# Patient Record
(demographics unavailable — no encounter records)

---

## 2025-03-21 NOTE — HEALTH RISK ASSESSMENT
[No] : In the past 12 months have you used drugs other than those required for medical reasons? No [No falls in past year] : Patient reported no falls in the past year [Little interest or pleasure doing things] : 1) Little interest or pleasure doing things [Feeling down, depressed, or hopeless] : 2) Feeling down, depressed, or hopeless [0] : 2) Feeling down, depressed, or hopeless: Not at all (0) [PHQ-2 Negative - No further assessment needed] : PHQ-2 Negative - No further assessment needed [Never] : Never [HIV test declined] : HIV test declined [Hepatitis C test declined] : Hepatitis C test declined [With Family] : lives with family [] :  [# Of Children ___] : has [unfilled] children [Sexually Active] : sexually active [Feels Safe at Home] : Feels safe at home [Fully functional (bathing, dressing, toileting, transferring, walking, feeding)] : Fully functional (bathing, dressing, toileting, transferring, walking, feeding) [Fully functional (using the telephone, shopping, preparing meals, housekeeping, doing laundry, using] : Fully functional and needs no help or supervision to perform IADLs (using the telephone, shopping, preparing meals, housekeeping, doing laundry, using transportation, managing medications and managing finances) [Smoke Detector] : smoke detector [Carbon Monoxide Detector] : carbon monoxide detector [Seat Belt] :  uses seat belt [Sunscreen] : uses sunscreen [[id number]] : 0 [Change in mental status noted] : No change in mental status noted [Reports changes in hearing] : Reports no changes in hearing [Reports changes in vision] : Reports no changes in vision [Reports changes in dental health] : Reports no changes in dental health [PapSmearDate] : 08/24 [PapSmearComments] : Dr. Fidelia Gray [HIVComments] : Done with HIV [de-identified] : Has an ophthalmologist. [de-identified] : Going regularly. [AdvancecareDate] : 03/21/2025 [FreeTextEntry4] : Patient given Health Care Proxy information today.

## 2025-03-21 NOTE — HISTORY OF PRESENT ILLNESS
[de-identified] : Patient presents for a follow-up annual physical.  Patient is a 60-year-old female with a history of HTN, intracranial hemorrhage, endometriosis.  She was at work on 6/21/2023 and had L sided facial weakness and slurred speech.  She was seen at [facility].  She was found to have a large intracranial hemorrhage in the R basal ganglia.  She is s/p laparotomy for the endometriosis and ovarian cysts.  She has had multiple IVF attempts and had 2 daughters via surrogacy.  She has a thyroid nodule.  Patient declines the influenza vaccination and COVID-19 boosters.  She believes she had a Tdap about 2016.  She declines the Shingrix.  She may be interested in getting an MMR booster.

## 2025-03-21 NOTE — ASSESSMENT
[FreeTextEntry1] : (1) HCM - discussed diet, exercise, weight maintenance.  Labs drawn ahead of visit and reviewed with patient.  HIV testing offered and patient declined.  Hep C screening declined.  Patient declines all vaccines, although she may take a Tdap booster next year.  Script for screening mammogram given.  Patient to continue Gyn exams as directed.  FIT kit given for colon cancer screening.  Patient advised that the follow-up of a positive FIT is a colonoscopy.  Patient advised to see the ophthalmologist annually.  Patient given Health Care Proxy information today.  (2) Neuro follow-up for h/o ICH.  BP good on current regimen.  (3) Repeat U/A with C&S (clean catch).

## 2025-07-18 NOTE — HISTORY OF PRESENT ILLNESS
[de-identified] : Patient referred by Dr. Angeles for evaluation of newly diagnosed papillary thyroid cancer with metastasis to bilateral cervical lymph nodes.  Patient experienced a stroke in June 2023 due to hypertension.  MRI revealed thyroid nodules.  Patient denies prior history of thyroid medication, radiation exposure, dysphagia or change in voice.  Thyroid ultrasound April 2025: Right lobe 5.8 x 1.7 x 2 cm with 9 mm upper and mid 8 mm nodules TR 3.  Left lobe 5.6 x 1.9 x 1.9 cm with mid 1.4 cm TR 2 nodule and mid calcified 8 mm TR 4 nodule.  Isthmus 11 x 10 x 8 mm calcified nodule TR 4.  Abnormal appearing right level 6 1.4 cm lymph node.  June 2025 patient underwent ultrasound-guided fine-needle aspiration of right supraclavicular 1.1 cm and left level three 6 x 5 mm lymph nodes.  Biopsies both positive for metastatic papillary thyroid cancer. I have reviewed all old and new data and available images.  Additional information was obtained from others present at the time of the visit to ensure the completeness of the history.

## 2025-07-18 NOTE — REASON FOR VISIT
[Initial Consultation] : an initial consultation for [FreeTextEntry2] : Papillary thyroid cancer metastatic to bilateral lateral cervical lymph nodes [Spouse] : spouse

## 2025-07-18 NOTE — ASSESSMENT
[FreeTextEntry1] : Patient with papillary thyroid cancer with bilateral lymph nodes positive for metastatic papillary thyroid cancer.  I have discussed a total thyroidectomy with bilateral modified radical neck dissection for definitive treatment with possible postop radioactive iodine.  The patient and her  do not want to proceed with surgery.  They are seeking alternative treatments. I have reviewed the pathophysiology of the disease process, the area anatomy and the rationale for surgery.  I discussed the risks, benefits and alternative treatments which include but are not limited to bleeding, infection, numbness, hoarseness, hypocalcemia, scarring, and need for reoperation.  I have answered the patient's questions to their satisfaction.  They plan to seek opinion at [facility] for possible clinical trials.  They will contact my office regarding their decision.

## 2025-07-18 NOTE — PHYSICAL EXAM
[Normal] : no neck adenopathy [de-identified] : Skin:  normal appearance.  no rash, nodules, vesicles, or erythema, Musculoskeletal:  full range of motion and no deformities appreciated Neurological:  grossly intact Psychiatric:  oriented to person, place and time with appropriate affect

## 2025-07-18 NOTE — CONSULT LETTER
[Dear  ___] : Dear  [unfilled], [Consult Letter:] : I had the pleasure of evaluating your patient, [unfilled]. [Please see my note below.] : Please see my note below. [Consult Closing:] : Thank you very much for allowing me to participate in the care of this patient.  If you have any questions, please do not hesitate to contact me. [Sincerely,] : Sincerely, [FreeTextEntry3] : Marilou Vu MD, FACS Director Thyroid and Parathyroid Surgery Assistant Professor of Surgery and Otolaryngology [facility]